# Patient Record
Sex: FEMALE | Employment: FULL TIME | URBAN - METROPOLITAN AREA
[De-identification: names, ages, dates, MRNs, and addresses within clinical notes are randomized per-mention and may not be internally consistent; named-entity substitution may affect disease eponyms.]

---

## 2018-06-26 ENCOUNTER — TELEPHONE (OUTPATIENT)
Dept: NEUROLOGY | Age: 33
End: 2018-06-26

## 2018-06-26 NOTE — TELEPHONE ENCOUNTER
Spoke with patient to let her known that Dr. June Vasquez he on call in the hospital, but I will forward her concern to the doctor.

## 2018-06-26 NOTE — TELEPHONE ENCOUNTER
----- Message from Umer Lui sent at 6/26/2018  3:06 PM EDT -----  Regarding: Dr. Cassandra Shah  Pt is a former pt of provider when in [facility] and now is living out of state requesting a referral to see a neurologist in [place]. Pt requesting for referral to be sent to Dr. Kiersten Cavazos ([phone]. Pt also requesting to speak with nurse in regards to medications and now pt is 6 weeks pregnant now. Best contact number [phone].

## 2018-06-27 ENCOUNTER — DOCUMENTATION ONLY (OUTPATIENT)
Dept: NEUROLOGY | Age: 33
End: 2018-06-27

## 2018-06-27 DIAGNOSIS — G70.00 MYASTHENIA GRAVIS (HCC): Primary | ICD-10-CM

## 2018-06-27 NOTE — TELEPHONE ENCOUNTER
Spoke with patient that Dr. Gabrielle Willoughby has done the referral and I will fax today. Per Dr. Gabrielle Willoughby I have formed patient her medication for myasthenia gravis  And its use will need clearance from her OB. If she needs her medical records, she will have to request them from [address] [place] [address]. Patient understood.

## 2018-06-27 NOTE — TELEPHONE ENCOUNTER
Dr. Sherren Salon patient states she was a patient at [facility] and needs a referral to see a Neurologist in [place] patient now lives out of town. Dr. Perla Michelle # [phone]. Patient is also concern regarding her medications she takes because she is 6 weeks pregnant now. Please advise.

## 2018-06-27 NOTE — TELEPHONE ENCOUNTER
Referral sent. Please inform patient her medication for MG and its use will need clearance from her OB. If she needs her medical records, she will have to request it from [place].

## 2018-06-28 ENCOUNTER — TELEPHONE (OUTPATIENT)
Dept: NEUROLOGY | Age: 33
End: 2018-06-28